# Patient Record
(demographics unavailable — no encounter records)

---

## 2024-11-15 NOTE — PROCEDURE
[FreeTextEntry1] : PFT: Normal spirometry.  Lung volumes normal. DLCO normal.  prior exams reviewed:  	 EXAM: [id number] - CT ANGIO HEART CORONARY IC  - ORDERED BY: TREVOR MCMAHON   PROCEDURE DATE:  06/07/2024    INTERPRETATION:  EXAMINATION:CT ANGIO HEART CORONARY  CLINICAL INDICATION:Chest pain, and dyspnea  COMPARISON: None.  TECHNIQUE: Informed consent was obtained from the patient and there were no complications during the procedure.  EKG gated CT angiogram of the heart was performed before and after  the administration of 79 cc administered Omnipaque 350, 21 cc discarded. The calcium score was calculated on a separate workstation.  Multiplanar and 3D reformats were performed on a separate workstation by the radiologist.  FINDINGS:  CALCIUM SCORE: The total Agatston coronary artery calcium score equals 0.  CORONARY CT ANGIOGRAM: There is a right dominant coronary arterial system. The left main coronary artery bifurcates. There is no stenosis or evidence of atherosclerosis involving the major epicardial coronary arteries.  HEART AND VESSELS: The heart is normal in size. There are no atherosclerotic calcifications of the aorta.  There is no pericardial effusion.  VISUALIZED AIRWAYS AND LUNGS: The bronchial tree is patent.  Left lower lobe 7 mm groundglass nodule (403, 1:30)  VISUALIZED MEDIASTINUM AND PLEURA: There are no enlarged mediastinal or hilar lymph nodes.  VISUALIZED UPPER ABDOMEN: Images of the upper abdomen demonstrate small hiatal hernia.  BONES AND SOFT TISSUES: The bones are unremarkable.  The soft tissues are unremarkable.  IMPRESSION:  Normal coronary arteries.  Left lower lobe 7 mm groundglass nodule. Three-month follow-up chest CT recommended  --- End of Report ---       LISHA MCMAHON MD; Attending Radiologist This document has been electronically signed. Jun 7 2024 12:04PM Reviewed:  PFT: Normal spirometry.  Lung volumes reduced. DLCO normal when corrected for volumes.   reports a recent cxr in april at [facility] was normal

## 2025-05-30 NOTE — PROCEDURE
[FreeTextEntry1] :  florian normal without bd response 	 EXAM: [id number] - CT CHEST  - ORDERED BY: SURESH SHANKAR   PROCEDURE DATE:  04/24/2025    INTERPRETATION:  INDICATION: nodule fu; TECHNIQUE: Unenhanced CT of the chest. Coronal, sagittal, and MIP images were reconstructed and reviewed. COMPARISON: 10/3/2024 chest CT.  FINDINGS:  AIRWAYS, LUNGS, PLEURA: Patent central airways. Stable 9 mm left lower lobe nodule. Stable 5 mm subpleural nodule within right lower lobe. The lungs are otherwise clear. No pleural effusion.  LYMPH NODES, MEDIASTINUM: No lymphadenopathy.  HEART, VESSELS: Heart size is normal. No pericardial effusion. Thoracic aorta normal in diameter.  UPPER ABDOMEN: Within normal limits.  CHEST WALL, BONES: Within normal limits.  LOWER NECK: Within normal limits.  IMPRESSION:  Stable lung nodules.  --- End of Report ---       IVANA KRAFT MD; Attending Radiologist This document has been electronically signed. May  5 2025 12:31PM  prior exams reviewed:  PFT: Normal spirometry.  Lung volumes normal. DLCO normal.   	 EXAM: [id number] - CT ANGIO HEART CORONARY IC  - ORDERED BY: TREVOR MCMAHON   PROCEDURE DATE:  06/07/2024    INTERPRETATION:  EXAMINATION:CT ANGIO HEART CORONARY  CLINICAL INDICATION:Chest pain, and dyspnea  COMPARISON: None.  TECHNIQUE: Informed consent was obtained from the patient and there were no complications during the procedure.  EKG gated CT angiogram of the heart was performed before and after  the administration of 79 cc administered Omnipaque 350, 21 cc discarded. The calcium score was calculated on a separate workstation.  Multiplanar and 3D reformats were performed on a separate workstation by the radiologist.  FINDINGS:  CALCIUM SCORE: The total Agatston coronary artery calcium score equals 0.  CORONARY CT ANGIOGRAM: There is a right dominant coronary arterial system. The left main coronary artery bifurcates. There is no stenosis or evidence of atherosclerosis involving the major epicardial coronary arteries.  HEART AND VESSELS: The heart is normal in size. There are no atherosclerotic calcifications of the aorta.  There is no pericardial effusion.  VISUALIZED AIRWAYS AND LUNGS: The bronchial tree is patent.  Left lower lobe 7 mm groundglass nodule (403, 1:30)  VISUALIZED MEDIASTINUM AND PLEURA: There are no enlarged mediastinal or hilar lymph nodes.  VISUALIZED UPPER ABDOMEN: Images of the upper abdomen demonstrate small hiatal hernia.  BONES AND SOFT TISSUES: The bones are unremarkable.  The soft tissues are unremarkable.  IMPRESSION:  Normal coronary arteries.  Left lower lobe 7 mm groundglass nodule. Three-month follow-up chest CT recommended  --- End of Report ---       LISHA MCMAHON MD; Attending Radiologist This document has been electronically signed. Jun 7 2024 12:04PM Reviewed:  PFT: Normal spirometry.  Lung volumes reduced. DLCO normal when corrected for volumes.   reports a recent cxr in april at [facility] was normal

## 2025-05-30 NOTE — ASSESSMENT
[FreeTextEntry1] : nodules will fu in 1 year  didnt feel like arnuity was working after 2 weeks of use, felt like device ran out of medication will try flovent hfa  fu 3 mo to assess cough on new inhaler

## 2025-05-30 NOTE — HISTORY OF PRESENT ILLNESS
[Never] : never [TextBox_4] : had repeat ct, nodules stable  sister got diagnosed with ra she will be seeing rheum now also bc of body aches  stopped arnuity about 2 weeks ago, doesnt feel big difference sometimes will cough at night, not sure if gerd

## 2025-06-19 NOTE — PHYSICAL EXAM
[General Appearance - Alert] : alert [General Appearance - In No Acute Distress] : in no acute distress [General Appearance - Well Nourished] : well nourished [General Appearance - Well Developed] : well developed [General Appearance - Well-Appearing] : healthy appearing [Ankle Swelling (On Exam)] : not present [Varicose Veins Of Lower Extremities] : not present [] : not present [Delayed in the Right Toes] : capillary refills normal in right toes [Delayed in the Left Toes] : capillary refills normal in the left toes [2+] : left foot dorsalis pedis 2+ [de-identified] : Chronic planta fasciitis.  Patient has a history of foot surgery well.  Patient presents today for x-rays.  We will also be scanning for custom orthotics.  Patient with first MPJ hallux limitus with some pain and prior surgery. [Sensation] : the sensory exam was normal to light touch and pinprick [No Focal Deficits] : no focal deficits [Deep Tendon Reflexes (DTR)] : deep tendon reflexes were 2+ and symmetric [Motor Exam] : the motor exam was normal [Oriented To Time, Place, And Person] : oriented to person, place, and time [Impaired Insight] : insight and judgment were intact [Affect] : the affect was normal

## 2025-06-19 NOTE — ASSESSMENT
[FreeTextEntry1] : Patient presents with several issues plantar fasciitis and hallux limitus bilateral.  X-rays taken both feet there is some arthritic changes no signs of fracture no signs of dislocation.  Discussed treatment options we will scanned patient for custom orthotics today to help with arch pain and hallux limitus.  Discussed possible cortisone injections we also discussed possible surgery patient states her pain has improved greatly and will avoid any invasive treatments at this time.  Patient will take occasional anti-inflammatory continue with warm soaks stretching and ice packs.  All questions answered and reviewed follow-up once orthotics arrive.

## 2025-06-19 NOTE — PHYSICAL EXAM
[General Appearance - Alert] : alert [General Appearance - In No Acute Distress] : in no acute distress [General Appearance - Well Nourished] : well nourished [General Appearance - Well Developed] : well developed [General Appearance - Well-Appearing] : healthy appearing [Ankle Swelling (On Exam)] : not present [Varicose Veins Of Lower Extremities] : not present [] : not present [Delayed in the Right Toes] : capillary refills normal in right toes [Delayed in the Left Toes] : capillary refills normal in the left toes [2+] : left foot dorsalis pedis 2+ [de-identified] : Chronic planta fasciitis.  Patient has a history of foot surgery well.  Patient presents today for x-rays.  We will also be scanning for custom orthotics.  Patient with first MPJ hallux limitus with some pain and prior surgery. [Sensation] : the sensory exam was normal to light touch and pinprick [No Focal Deficits] : no focal deficits [Deep Tendon Reflexes (DTR)] : deep tendon reflexes were 2+ and symmetric [Motor Exam] : the motor exam was normal [Oriented To Time, Place, And Person] : oriented to person, place, and time [Impaired Insight] : insight and judgment were intact [Affect] : the affect was normal